# Patient Record
Sex: MALE | Race: BLACK OR AFRICAN AMERICAN | Employment: OTHER | URBAN - METROPOLITAN AREA
[De-identification: names, ages, dates, MRNs, and addresses within clinical notes are randomized per-mention and may not be internally consistent; named-entity substitution may affect disease eponyms.]

---

## 2017-11-28 ENCOUNTER — HOSPITAL ENCOUNTER (OUTPATIENT)
Dept: ENDOSCOPY | Age: 67
Discharge: HOME | End: 2017-11-28
Attending: INTERNAL MEDICINE | Admitting: INTERNAL MEDICINE

## 2017-11-28 RX ORDER — SODIUM CHLORIDE 0.9 % (FLUSH) 0.9 %
10 SYRINGE (ML) INJECTION PRN
Status: DISCONTINUED | OUTPATIENT
Start: 2017-11-28 | End: 2017-11-29 | Stop reason: HOSPADM

## 2017-11-28 RX ORDER — SODIUM CHLORIDE 0.9 % (FLUSH) 0.9 %
10 SYRINGE (ML) INJECTION EVERY 12 HOURS SCHEDULED
Status: DISCONTINUED | OUTPATIENT
Start: 2017-11-28 | End: 2017-11-29 | Stop reason: HOSPADM

## 2017-11-28 RX ORDER — SODIUM CHLORIDE 9 MG/ML
INJECTION, SOLUTION INTRAVENOUS CONTINUOUS
Status: DISCONTINUED | OUTPATIENT
Start: 2017-11-28 | End: 2017-11-29 | Stop reason: HOSPADM

## 2017-11-28 NOTE — ANESTHESIA PRE-OP
Department of Anesthesiology  Preprocedure Note       Name:  Rhett Chiu   Age:  79 y.o.  :  1950                                          MRN:  [id number]         Date:  2017      Surgeon:    Procedure:    Medications prior to admission:   Prior to Admission medications    Medication Sig Start Date End Date Taking? Authorizing Provider   naproxen (NAPROSYN) 500 MG tablet Take 1 tablet by mouth 2 times daily for 20 doses 9/24/16 10/4/16  Henry JAMAR Restrepo   atorvastatin (LIPITOR) 40 MG tablet Take 40 mg by mouth daily. Historical Provider, MD   niacin (NIASPAN) 500 MG CR tablet Take 1,000 mg by mouth nightly. Historical Provider, MD   vitamin D (CHOLECALCIFEROL) 400 UNIT TABS tablet Take 400 Units by mouth daily. Historical Provider, MD   amlodipine (NORVASC) 5 MG tablet Take 5 mg by mouth See Admin Instructions. Daily med ? mg     Historical Provider, MD       Current medications:    Current Outpatient Prescriptions   Medication Sig Dispense Refill    naproxen (NAPROSYN) 500 MG tablet Take 1 tablet by mouth 2 times daily for 20 doses 20 tablet 0    atorvastatin (LIPITOR) 40 MG tablet Take 40 mg by mouth daily.  niacin (NIASPAN) 500 MG CR tablet Take 1,000 mg by mouth nightly.  vitamin D (CHOLECALCIFEROL) 400 UNIT TABS tablet Take 400 Units by mouth daily.  amlodipine (NORVASC) 5 MG tablet Take 5 mg by mouth See Admin Instructions. Daily med ? mg        Current Facility-Administered Medications   Medication Dose Route Frequency Provider Last Rate Last Dose    0.9 % sodium chloride infusion   Intravenous Continuous Rochelle Oliva MD        sodium chloride flush 0.9 % injection 10 mL  10 mL Intravenous 2 times per day Rochelle Oliva MD        sodium chloride flush 0.9 % injection 10 mL  10 mL Intravenous PRN Rochelle Oliva MD           Allergies:  No Known Allergies    Problem List:  There is no problem list on file for this patient.       Past Medical

## 2018-05-07 ENCOUNTER — WALK IN (OUTPATIENT)
Dept: URGENT CARE | Age: 68
End: 2018-05-07

## 2018-05-07 ENCOUNTER — IMAGING SERVICES (OUTPATIENT)
Dept: GENERAL RADIOLOGY | Age: 68
End: 2018-05-07

## 2018-05-07 VITALS
SYSTOLIC BLOOD PRESSURE: 133 MMHG | RESPIRATION RATE: 16 BRPM | DIASTOLIC BLOOD PRESSURE: 60 MMHG | BODY MASS INDEX: 26.48 KG/M2 | OXYGEN SATURATION: 99 % | TEMPERATURE: 98.7 F | HEART RATE: 110 BPM | WEIGHT: 185 LBS | HEIGHT: 70 IN

## 2018-05-07 DIAGNOSIS — R05.9 COUGH: ICD-10-CM

## 2018-05-07 DIAGNOSIS — R06.02 SOB (SHORTNESS OF BREATH): Primary | ICD-10-CM

## 2018-05-07 DIAGNOSIS — J81.0 ACUTE PULMONARY EDEMA (CMD): ICD-10-CM

## 2018-05-07 DIAGNOSIS — R06.02 SOB (SHORTNESS OF BREATH): ICD-10-CM

## 2018-05-07 PROCEDURE — 94640 AIRWAY INHALATION TREATMENT: CPT | Performed by: FAMILY MEDICINE

## 2018-05-07 PROCEDURE — 93000 ELECTROCARDIOGRAM COMPLETE: CPT | Performed by: FAMILY MEDICINE

## 2018-05-07 PROCEDURE — 99214 OFFICE O/P EST MOD 30 MIN: CPT | Performed by: FAMILY MEDICINE

## 2018-05-07 PROCEDURE — 71046 X-RAY EXAM CHEST 2 VIEWS: CPT | Performed by: RADIOLOGY

## 2018-05-07 RX ORDER — IPRATROPIUM BROMIDE AND ALBUTEROL SULFATE 2.5; .5 MG/3ML; MG/3ML
3 SOLUTION RESPIRATORY (INHALATION) ONCE
Status: COMPLETED | OUTPATIENT
Start: 2018-05-07 | End: 2018-05-07

## 2018-05-07 RX ADMIN — IPRATROPIUM BROMIDE AND ALBUTEROL SULFATE 3 ML: 2.5; .5 SOLUTION RESPIRATORY (INHALATION) at 17:22

## 2018-11-28 ENCOUNTER — HOSPITAL ENCOUNTER (EMERGENCY)
Age: 68
Discharge: HOME | End: 2018-11-28
Attending: EMERGENCY MEDICINE
Payer: MEDICARE

## 2018-11-28 VITALS
BODY MASS INDEX: 26.98 KG/M2 | OXYGEN SATURATION: 95 % | TEMPERATURE: 98 F | SYSTOLIC BLOOD PRESSURE: 169 MMHG | HEIGHT: 68 IN | WEIGHT: 178 LBS | HEART RATE: 82 BPM | RESPIRATION RATE: 16 BRPM | DIASTOLIC BLOOD PRESSURE: 89 MMHG

## 2018-11-28 DIAGNOSIS — K62.5 RECTAL BLEED: ICD-10-CM

## 2018-11-28 DIAGNOSIS — K59.00 CONSTIPATION, UNSPECIFIED CONSTIPATION TYPE: Primary | ICD-10-CM

## 2018-11-28 PROCEDURE — 99283 EMERGENCY DEPT VISIT LOW MDM: CPT

## 2018-11-28 RX ORDER — MAGNESIUM CITRATE
150 SOLUTION, ORAL ORAL ONCE
Qty: 1 BOTTLE | Refills: 0 | Status: SHIPPED | OUTPATIENT
Start: 2018-11-28 | End: 2018-11-28

## 2018-11-28 NOTE — ED PROVIDER NOTES
display    All other labs were within normal range or not returned as ofthis dictation. EMERGENCYDEPARTMENT COURSE and DIFFERENTIAL DIAGNOSIS/MDM:   Vitals:    Vitals:    11/28/18 0252   BP: (!) 169/89   Pulse: 82   Resp: 16   Temp: 98 °F (36.7 °C)   TempSrc: Oral   SpO2: 95%   Weight: 178 lb (80.7 kg)   Height: 5' 8\" (1.727 m)       MDM:   Patient in follow-up in the outpatient setting with his family doctor and also GI. We'll trial mag citrate and I recommended continuing his suppositories and Preparation H.      CONSULTS:  None    PROCEDURES:  Unless otherwise noted below, none       Procedures    FINAL IMPRESSION      1. Constipation, unspecified constipation type    2.  Rectal bleed          DISPOSITION/PLAN   DISPOSITION Decision To Discharge 11/28/2018 02:57:00 AM      PATIENT REFERRED TO:  Xiomara Hidalgo MD  [address] [place] [address], [address]  [phone]    In 2 days  Please consult with your gastroenterologist if symptoms are not resolved      DISCHARGE MEDICATIONS:  New Prescriptions    MAGNESIUM CITRATE (CITROMA) SOLN    Take 150 mLs by mouth once for 1 dose          (Please note that portions of this note were completed with a voice recognition program.  Efforts weremade to edit the dictations but occasionally words are mis-transcribed.)    Kavya Perdomo III, DO (electronically signed)  Attending Emergency Physician         Denny Berumen III, DO  11/28/18 6899

## 2018-11-28 NOTE — ED NOTES
Pt received discharge instructions. Pt verbalizes understanding. Pt denies any questions. Pt able to ambulate free of distress out of the ED.         Mojgan Torre RN  11/28/18 4556

## 2019-08-21 ENCOUNTER — APPOINTMENT (OUTPATIENT)
Dept: MRI IMAGING | Age: 69
End: 2019-08-21
Payer: MEDICARE

## 2019-08-21 ENCOUNTER — HOSPITAL ENCOUNTER (OUTPATIENT)
Age: 69
Setting detail: OBSERVATION
Discharge: HOME | End: 2019-08-22
Attending: EMERGENCY MEDICINE | Admitting: INTERNAL MEDICINE
Payer: MEDICARE

## 2019-08-21 ENCOUNTER — APPOINTMENT (OUTPATIENT)
Dept: CT IMAGING | Age: 69
End: 2019-08-21
Payer: MEDICARE

## 2019-08-21 ENCOUNTER — APPOINTMENT (OUTPATIENT)
Dept: GENERAL RADIOLOGY | Age: 69
End: 2019-08-21
Payer: MEDICARE

## 2019-08-21 DIAGNOSIS — I65.23 BILATERAL CAROTID ARTERY STENOSIS: ICD-10-CM

## 2019-08-21 DIAGNOSIS — G45.9 TIA (TRANSIENT ISCHEMIC ATTACK): Primary | ICD-10-CM

## 2019-08-21 DIAGNOSIS — E04.1 THYROID NODULE: ICD-10-CM

## 2019-08-21 LAB
A/G RATIO: 1.7 (ref 1.1–2.2)
ALBUMIN SERPL-MCNC: 4.5 G/DL (ref 3.4–5)
ALP BLD-CCNC: 86 U/L (ref 40–129)
ALT SERPL-CCNC: 21 U/L (ref 10–40)
ANION GAP SERPL CALCULATED.3IONS-SCNC: 10 MMOL/L (ref 3–16)
APTT: 29.9 SEC (ref 26–36)
AST SERPL-CCNC: 21 U/L (ref 15–37)
BASOPHILS ABSOLUTE: 0.1 K/UL (ref 0–0.2)
BASOPHILS RELATIVE PERCENT: 1 %
BILIRUB SERPL-MCNC: 0.3 MG/DL (ref 0–1)
BUN BLDV-MCNC: 15 MG/DL (ref 7–20)
CALCIUM SERPL-MCNC: 9.9 MG/DL (ref 8.3–10.6)
CHLORIDE BLD-SCNC: 104 MMOL/L (ref 99–110)
CO2: 23 MMOL/L (ref 21–32)
CREAT SERPL-MCNC: 1 MG/DL (ref 0.8–1.3)
EKG ATRIAL RATE: 71 BPM
EKG DIAGNOSIS: NORMAL
EKG P AXIS: 51 DEGREES
EKG P-R INTERVAL: 140 MS
EKG Q-T INTERVAL: 390 MS
EKG QRS DURATION: 86 MS
EKG QTC CALCULATION (BAZETT): 423 MS
EKG R AXIS: 2 DEGREES
EKG T AXIS: 33 DEGREES
EKG VENTRICULAR RATE: 71 BPM
EOSINOPHILS ABSOLUTE: 0.1 K/UL (ref 0–0.6)
EOSINOPHILS RELATIVE PERCENT: 2 %
GFR AFRICAN AMERICAN: >60
GFR NON-AFRICAN AMERICAN: >60
GLOBULIN: 2.6 G/DL
GLUCOSE BLD-MCNC: 105 MG/DL (ref 70–99)
GLUCOSE BLD-MCNC: 118 MG/DL (ref 70–99)
HCT VFR BLD CALC: 44.1 % (ref 40.5–52.5)
HEMOGLOBIN: 15.1 G/DL (ref 13.5–17.5)
INR BLD: 0.96 (ref 0.86–1.14)
LYMPHOCYTES ABSOLUTE: 2.5 K/UL (ref 1–5.1)
LYMPHOCYTES RELATIVE PERCENT: 41.7 %
MAGNESIUM: 2 MG/DL (ref 1.8–2.4)
MCH RBC QN AUTO: 31 PG (ref 26–34)
MCHC RBC AUTO-ENTMCNC: 34.2 G/DL (ref 31–36)
MCV RBC AUTO: 90.7 FL (ref 80–100)
MONOCYTES ABSOLUTE: 0.8 K/UL (ref 0–1.3)
MONOCYTES RELATIVE PERCENT: 12.5 %
NEUTROPHILS ABSOLUTE: 2.6 K/UL (ref 1.7–7.7)
NEUTROPHILS RELATIVE PERCENT: 42.8 %
PDW BLD-RTO: 14.7 % (ref 12.4–15.4)
PERFORMED ON: ABNORMAL
PLATELET # BLD: 233 K/UL (ref 135–450)
PMV BLD AUTO: 8.8 FL (ref 5–10.5)
POTASSIUM SERPL-SCNC: 4.2 MMOL/L (ref 3.5–5.1)
PROTHROMBIN TIME: 11 SEC (ref 9.8–13)
RBC # BLD: 4.86 M/UL (ref 4.2–5.9)
SODIUM BLD-SCNC: 137 MMOL/L (ref 136–145)
TOTAL PROTEIN: 7.1 G/DL (ref 6.4–8.2)
TROPONIN: <0.01 NG/ML
TSH REFLEX: 0.59 UIU/ML (ref 0.27–4.2)
WBC # BLD: 6.1 K/UL (ref 4–11)

## 2019-08-21 PROCEDURE — 84484 ASSAY OF TROPONIN QUANT: CPT

## 2019-08-21 PROCEDURE — 93010 ELECTROCARDIOGRAM REPORT: CPT | Performed by: INTERNAL MEDICINE

## 2019-08-21 PROCEDURE — 83735 ASSAY OF MAGNESIUM: CPT

## 2019-08-21 PROCEDURE — 6370000000 HC RX 637 (ALT 250 FOR IP): Performed by: INTERNAL MEDICINE

## 2019-08-21 PROCEDURE — 85025 COMPLETE CBC W/AUTO DIFF WBC: CPT

## 2019-08-21 PROCEDURE — G0378 HOSPITAL OBSERVATION PER HR: HCPCS

## 2019-08-21 PROCEDURE — 99285 EMERGENCY DEPT VISIT HI MDM: CPT

## 2019-08-21 PROCEDURE — 85730 THROMBOPLASTIN TIME PARTIAL: CPT

## 2019-08-21 PROCEDURE — 93005 ELECTROCARDIOGRAM TRACING: CPT | Performed by: PHYSICIAN ASSISTANT

## 2019-08-21 PROCEDURE — 70551 MRI BRAIN STEM W/O DYE: CPT

## 2019-08-21 PROCEDURE — 84443 ASSAY THYROID STIM HORMONE: CPT

## 2019-08-21 PROCEDURE — 6360000004 HC RX CONTRAST MEDICATION: Performed by: EMERGENCY MEDICINE

## 2019-08-21 PROCEDURE — 70496 CT ANGIOGRAPHY HEAD: CPT

## 2019-08-21 PROCEDURE — 85610 PROTHROMBIN TIME: CPT

## 2019-08-21 PROCEDURE — 36415 COLL VENOUS BLD VENIPUNCTURE: CPT

## 2019-08-21 PROCEDURE — 71046 X-RAY EXAM CHEST 2 VIEWS: CPT

## 2019-08-21 PROCEDURE — 80053 COMPREHEN METABOLIC PANEL: CPT

## 2019-08-21 PROCEDURE — 2580000003 HC RX 258: Performed by: INTERNAL MEDICINE

## 2019-08-21 PROCEDURE — 70450 CT HEAD/BRAIN W/O DYE: CPT

## 2019-08-21 RX ORDER — ASPIRIN 300 MG/1
300 SUPPOSITORY RECTAL DAILY
Status: DISCONTINUED | OUTPATIENT
Start: 2019-08-21 | End: 2019-08-22 | Stop reason: HOSPADM

## 2019-08-21 RX ORDER — SODIUM CHLORIDE 0.9 % (FLUSH) 0.9 %
10 SYRINGE (ML) INJECTION PRN
Status: DISCONTINUED | OUTPATIENT
Start: 2019-08-21 | End: 2019-08-22 | Stop reason: HOSPADM

## 2019-08-21 RX ORDER — ONDANSETRON 2 MG/ML
4 INJECTION INTRAMUSCULAR; INTRAVENOUS EVERY 6 HOURS PRN
Status: DISCONTINUED | OUTPATIENT
Start: 2019-08-21 | End: 2019-08-22 | Stop reason: HOSPADM

## 2019-08-21 RX ORDER — OMEGA-3S/DHA/EPA/FISH OIL/D3 300MG-1000
400 CAPSULE ORAL DAILY
Status: DISCONTINUED | OUTPATIENT
Start: 2019-08-22 | End: 2019-08-22 | Stop reason: HOSPADM

## 2019-08-21 RX ORDER — TIZANIDINE 4 MG/1
4 TABLET ORAL EVERY 8 HOURS PRN
Status: DISCONTINUED | OUTPATIENT
Start: 2019-08-21 | End: 2019-08-22 | Stop reason: HOSPADM

## 2019-08-21 RX ORDER — ROSUVASTATIN CALCIUM 40 MG/1
40 TABLET, COATED ORAL NIGHTLY
Status: DISCONTINUED | OUTPATIENT
Start: 2019-08-21 | End: 2019-08-22 | Stop reason: HOSPADM

## 2019-08-21 RX ORDER — SODIUM CHLORIDE 0.9 % (FLUSH) 0.9 %
10 SYRINGE (ML) INJECTION EVERY 12 HOURS SCHEDULED
Status: DISCONTINUED | OUTPATIENT
Start: 2019-08-21 | End: 2019-08-22 | Stop reason: HOSPADM

## 2019-08-21 RX ORDER — ASPIRIN 81 MG/1
81 TABLET ORAL DAILY
Status: DISCONTINUED | OUTPATIENT
Start: 2019-08-21 | End: 2019-08-22 | Stop reason: HOSPADM

## 2019-08-21 RX ORDER — ROSUVASTATIN CALCIUM 40 MG/1
TABLET, COATED ORAL
Refills: 0 | COMMUNITY
Start: 2019-06-27

## 2019-08-21 RX ORDER — TIZANIDINE 2 MG/1
TABLET ORAL
Refills: 0 | COMMUNITY
Start: 2019-07-29 | End: 2022-02-03 | Stop reason: ALTCHOICE

## 2019-08-21 RX ORDER — LOSARTAN POTASSIUM 25 MG/1
100 TABLET ORAL DAILY
Refills: 0 | COMMUNITY
Start: 2019-07-29

## 2019-08-21 RX ADMIN — ASPIRIN 81 MG: 81 TABLET, COATED ORAL at 17:01

## 2019-08-21 RX ADMIN — Medication 10 ML: at 23:40

## 2019-08-21 RX ADMIN — IOPAMIDOL 75 ML: 755 INJECTION, SOLUTION INTRAVENOUS at 12:12

## 2019-08-21 RX ADMIN — ROSUVASTATIN CALCIUM 40 MG: 40 TABLET, FILM COATED ORAL at 23:39

## 2019-08-21 ASSESSMENT — PAIN SCALES - GENERAL: PAINLEVEL_OUTOF10: 0

## 2019-08-21 NOTE — ED PROVIDER NOTES
Neck: Normal range of motion. No thyromegaly present. The patient does have 2/6 bilateral carotid bruits. Cardiovascular: Normal rate, regular rhythm, normal heart sounds and intact distal pulses. No murmur heard. Pulmonary/Chest: Effort normal and breath sounds normal. He exhibits no tenderness. Abdominal: Soft. Bowel sounds are normal. He exhibits no abdominal bruit. There is no hepatosplenomegaly. There is no tenderness. No evidence of abdominal or femoral bruits. Musculoskeletal: Normal range of motion. Lymphadenopathy:     He has no cervical adenopathy. Neurological: He is alert and oriented to person, place, and time. He displays normal reflexes. No cranial nerve deficit. Coordination normal.   Evidence of drift. Sensory intact with exception of left cheek/jaw area. Skin: Skin is warm and dry. Psychiatric: He has a normal mood and affect. His behavior is normal. Judgment and thought content normal.   Nursing note and vitals reviewed.       DIAGNOSTIC RESULTS   LABS:    Labs Reviewed   COMPREHENSIVE METABOLIC PANEL - Abnormal; Notable for the following components:       Result Value    Glucose 118 (*)     All other components within normal limits    Narrative:     Performed at:  [facility] [address], [place], [address] [employer]   Phone [phone]   POCT GLUCOSE - Abnormal; Notable for the following components:    POC Glucose 105 (*)     All other components within normal limits    Narrative:     Performed at:  [facility] [address], [place], [address] [employer]   Phone [phone]   CBC WITH AUTO DIFFERENTIAL    Narrative:     Performed at:  [facility] [address], [place], [address] [employer]   Phone [phone]   TROPONIN    Narrative:     Performed at:  [facility] [address], [place], [address] [employer]   Phone [phone]   APTT    Narrative: Performed at:  [facility]  [address]  [place], [address]   Phone [phone]   PROTIME-INR    Narrative:     Performed at:  [facility]  [address] [facility]  [place], [address]   Phone [phone]   MAGNESIUM    Narrative:     Performed at:  [facility]  [address] [facility]  [place], [address]   Phone [phone]   URINE RT REFLEX TO CULTURE   TSH WITH REFLEX   POCT GLUCOSE       All other labs were within normal range or not returned as of this dictation. EKG: All EKG's are interpreted by the Emergency Department Physician in the absence of a cardiologist.  Please see their note for interpretation of EKG. RADIOLOGY:   Non-plain film images such as CT, Ultrasound and MRI are read by the radiologist. Marina Hemphill radiographic images are visualized andpreliminarily interpreted by the  ED Provider with the below findings:    Chest x-ray shows no acute cardiopulmonary pathology. CT head without contrast is negative for acute pathology. CTA head and neck shows no acute pathology. Incidental finding of thyroid nodule. Interpretation [facility] Radiologist below, if available at the time of this note:    XR CHEST STANDARD (2 VW)   Final Result   No acute findings         CT Head WO Contrast   Final Result   No acute intracranial abnormality. Findings were discussed with Moise Perez at 11:45 am on 8/21/2019. CTA HEAD NECK W CONTRAST   Preliminary Result   No high-grade stenosis or focal occlusion involving the intracranial or   cervical vasculature. No evidence of acute dissection. No evidence of   aneurysm. Homogeneously enlarged thyroid gland without thyroid nodule. Recommend   correlation with thyroid labs. Emphysema. No results found.         PROCEDURES   Unless otherwise noted below, none     Procedures    CRITICAL CARE TIME   N/A    CONSULTS:  IP CONSULT TO

## 2019-08-21 NOTE — ED NOTES
Pharmacy Medication History Note      List of current medications patient is taking is complete. Source of information: patient    Changes made to medication list:  Medications flagged for removal (include reason, ex. noncompliance):  N/A    Medications removed (include reason, ex. therapy complete or physician discontinued):  Naproxen- therapy complete  Atorvastatin- discontinued  Niacin- therapy complete    Medications added/doses adjusted:  Rouvastatin 40mg- QD    Other notes (ex. Recent course of antibiotics, Coumadin dosing):  Denies use of other OTC or herbal medications. Last dose times updated. Nicole Rodriguez [facility]    No current facility-administered medications on file prior to encounter. Current Outpatient Medications on File Prior to Encounter   Medication Sig Dispense Refill    losartan (COZAAR) 25 MG tablet TAKE 1 TABLET BY MOUTH ONE TIME A DAY  0    tiZANidine (ZANAFLEX) 2 MG tablet TAKE 1 TABLET BY MOUTH EVERY SIX HOURS AS NEEDED  0    rosuvastatin (CRESTOR) 40 MG tablet TAKE 1 TABLET BY MOUTH ONE TIME A DAY  0    vitamin D (CHOLECALCIFEROL) 400 UNIT TABS tablet Take 400 Units by mouth daily. amlodipine (NORVASC) 5 MG tablet Take 5 mg by mouth daily       [DISCONTINUED] naproxen (NAPROSYN) 500 MG tablet Take 1 tablet by mouth 2 times daily for 20 doses 20 tablet 0    [DISCONTINUED] atorvastatin (LIPITOR) 40 MG tablet Take 40 mg by mouth daily. [DISCONTINUED] niacin (NIASPAN) 500 MG CR tablet Take 1,000 mg by mouth nightly.

## 2019-08-21 NOTE — H&P
HOSPITALISTS HISTORY AND PHYSICAL    8/21/2019 2:20 PM    Patient Information:  Gloria Aldrich is a 71 y.o. male [id number]  PCP:  Harris Muñiz MD (Tel: [phone] )    Chief complaint:    Chief Complaint   Patient presents with    Numbness     left arm numbness x1 week. left leg and left sided facial numbness x1 hour. BP [address]        History of Present Illness:  Alana Ackerman is a 71 y.o. male patient presented with wife and currently in wheelchair at triage. I am called to triage. Patient reporting tingling left arm x1 week and the patient now reports tingling, numbness and weakness of left face and left leg beginning approximately 1 hour ago or approximately 10:15 AM.  States he noticed this on the work and drove home. States he had some difficulty with walking but was able to walk. He indicates no headache at this time. He does indicate a mild stiff neck yesterday but it resolved as of today. He has not had previous similar occurrence. Denies chest pain, shortness of breath, vertigo, dizziness, bowel or bladder incontinence or visual changes. Patient does report history of hypertension. He does take his medication as prescribed. His blood pressure initially is 162/83 pulse 84. Blood sugar is 105. Patient has not had previous similar occurrence. He does report his father had first stroke at age 67. This patient ages 71.     Patient does have history of hypertension, hyperlipidemia and he does smoke.     Patient does have surgical history of neck surgery and hernia repair.     It is now 11:55 AM.  The patient a tingling in the weakness the leg have resolved. He states he has decreased but mild residual tingling/numbness of the left cheek area. Patient does state the left arm tingling has resolved.       REVIEW OF SYSTEMS:   10 point ROS was completed and negative unless noted pulses palpable   Labs:  CBC:   Lab Results   Component Value Date    WBC 6.1 2019    RBC 4.86 2019    HGB 15.1 2019    HCT 44.1 2019    MCV 90.7 2019    MCH 31.0 2019    MCHC 34.2 2019    RDW 14.7 2019     2019    MPV 8.8 2019     BMP:    Lab Results   Component Value Date     2019    K 4.2 2019     2019    CO2 23 2019    BUN 15 2019    CREATININE 1.0 2019    CALCIUM 9.9 2019    GFRAA >60 2019    GFRAA >60 2011    LABGLOM >60 2019    GLUCOSE 118 2019     XR CHEST STANDARD (2 VW)   Final Result   No acute findings         CT Head WO Contrast   Final Result   No acute intracranial abnormality. Findings were discussed with Tristen Silverman at 11:45 am on 2019. CTA HEAD NECK W CONTRAST   Preliminary Result   No high-grade stenosis or focal occlusion involving the intracranial or   cervical vasculature. No evidence of acute dissection. No evidence of   aneurysm. Homogeneously enlarged thyroid gland without thyroid nodule. Recommend   correlation with thyroid labs. Emphysema. Chest Xray: Nothing acute  EKG: Normal sinus rhythm  CTA of the head and neck was negative for acute disease  CT scan of the head was negative    Problem List  Active Problems:    TIA (transient ischemic attack)  Resolved Problems:    * No resolved hospital problems.  *        Assessment/Plan:   TIA resolved patient has risk factor in the form of hypertension and smoking obtain MRI of the head echo give aspirin and Crestor    STROKE Pathway activated    Permissive hypertension    Smoker advised to quit smoking      DVT prophylaxis Lovenox  Code status Full  Diet cardiac     Nathalia Wilkinson MD    2019 2:20 PM

## 2019-08-21 NOTE — CARE COORDINATION
Discharge Planning Assessment  SW discharge planner met with patient to discuss reason for admission, current living situation, and potential needs at the time of discharge. Pt in ED d/t TIA    Demographics/Insurance verified:  Yes    Current type of dwelling:  House    Living arrangements:  w/spouse     Level of function/Support:  Pt reports he is independent with ADLs and this has not affected his ability to walk or function. 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 Spouse is supportive. PCP:  Dr. Yakelin Dennison    Last Visit to PCP:  June    DME:  None. No needs reported. Active with any community resources/agencies/skilled home care:   None. No non-skilled needs reported. Medication compliance issues:  No    Financial issues that could impact healthcare:  No    Transportation at the time of discharge:  Pt drives. Spouse will take home. Tentative discharge plan:  Home most likely. No needs identified.     Electronically signed by ALENA Otero, LSW on 8/21/2019 at 4:37 PM

## 2019-08-21 NOTE — ED PROVIDER NOTES
I independently performed a history and physical on [facility]. All diagnostic, treatment, and disposition decisions were made by myself in conjunction with the advanced practice provider. Briefly, this is a 71 y.o. male here for left-sided numbness. The patient has had intermittent numbness to his left arm for the last couple of weeks. Today, he developed numbness to his left leg and left face along with his left arm, which was concerning. His family, including his daughter who is a nurse, or particularly concerned made him come to the emergency department. The patient did not have any associated focal weakness. He had no speech changes. He had no headaches he did not pass out. He had no visual changes. .    On exam, the patient's symptoms seem to have resolved as the patient is equal and normal sensation to light touch throughout his entire body, including his affected extremities and face. He is alert and oriented to person, place, time, and situation. CN II-XII intact as tested. Strength 5/5 in upper and lower extremities bilaterally. Normal reflexes. No pronator drift. Normal finger-nose bilaterally. Normal heel-shin bilaterally. No speech difficulties or slurring. Heart is RRR. Lungs CTAB. EKG   The Ekg interpreted by me in the absence of a cardiologist shows. normal sinus rhythm with a rate of 71; occasional PVCs  Axis is   Normal  QTc is  normal  Intervals and Durations are unremarkable. No specific ST-T wave changes appreciated. No evidence of acute ischemia. No significant change from prior EKG dated 6/4/2011    Screenings  NIH Stroke Scale  NIH Stroke Scale Assessed: Yes  Interval: Baseline  Level of Consciousness (1a. ): Alert  LOC Questions (1b. ):  Answers both correctly  LOC Commands (1c. ): Performs both tasks correctly  Best Gaze (2. ): Normal  Visual (3. ): No visual loss  Facial Palsy (4. ): Normal symmetrical movement  Motor Arm, Left (5a. ): No drift  Motor Arm, Right

## 2019-08-22 ENCOUNTER — APPOINTMENT (OUTPATIENT)
Dept: GENERAL RADIOLOGY | Age: 69
End: 2019-08-22
Payer: MEDICARE

## 2019-08-22 VITALS
BODY MASS INDEX: 26.5 KG/M2 | OXYGEN SATURATION: 96 % | RESPIRATION RATE: 16 BRPM | SYSTOLIC BLOOD PRESSURE: 145 MMHG | DIASTOLIC BLOOD PRESSURE: 71 MMHG | WEIGHT: 174.82 LBS | HEIGHT: 68 IN | HEART RATE: 54 BPM | TEMPERATURE: 97.4 F

## 2019-08-22 LAB
CHOLESTEROL, TOTAL: 142 MG/DL (ref 0–199)
ESTIMATED AVERAGE GLUCOSE: 105.4 MG/DL
HBA1C MFR BLD: 5.3 %
HCT VFR BLD CALC: 43.3 % (ref 40.5–52.5)
HDLC SERPL-MCNC: 49 MG/DL (ref 40–60)
HEMOGLOBIN: 14.6 G/DL (ref 13.5–17.5)
LDL CHOLESTEROL CALCULATED: 37 MG/DL
LEFT VENTRICULAR EJECTION FRACTION HIGH VALUE: 55 %
LEFT VENTRICULAR EJECTION FRACTION MODE: NORMAL
LV EF: 55 %
LVEF MODALITY: NORMAL
MCH RBC QN AUTO: 31.2 PG (ref 26–34)
MCHC RBC AUTO-ENTMCNC: 33.7 G/DL (ref 31–36)
MCV RBC AUTO: 92.4 FL (ref 80–100)
PDW BLD-RTO: 14.6 % (ref 12.4–15.4)
PLATELET # BLD: 208 K/UL (ref 135–450)
PMV BLD AUTO: 8.6 FL (ref 5–10.5)
RBC # BLD: 4.68 M/UL (ref 4.2–5.9)
TRIGL SERPL-MCNC: 280 MG/DL (ref 0–150)
VLDLC SERPL CALC-MCNC: 56 MG/DL
WBC # BLD: 4.9 K/UL (ref 4–11)

## 2019-08-22 PROCEDURE — C8923 2D TTE W OR W/O FOL W/CON,CO: HCPCS

## 2019-08-22 PROCEDURE — 80061 LIPID PANEL: CPT

## 2019-08-22 PROCEDURE — 92526 ORAL FUNCTION THERAPY: CPT

## 2019-08-22 PROCEDURE — 73030 X-RAY EXAM OF SHOULDER: CPT

## 2019-08-22 PROCEDURE — 97165 OT EVAL LOW COMPLEX 30 MIN: CPT

## 2019-08-22 PROCEDURE — 83036 HEMOGLOBIN GLYCOSYLATED A1C: CPT

## 2019-08-22 PROCEDURE — 85027 COMPLETE CBC AUTOMATED: CPT

## 2019-08-22 PROCEDURE — 92610 EVALUATE SWALLOWING FUNCTION: CPT

## 2019-08-22 PROCEDURE — G0378 HOSPITAL OBSERVATION PER HR: HCPCS

## 2019-08-22 PROCEDURE — 36415 COLL VENOUS BLD VENIPUNCTURE: CPT

## 2019-08-22 PROCEDURE — 94760 N-INVAS EAR/PLS OXIMETRY 1: CPT

## 2019-08-22 PROCEDURE — 73010 X-RAY EXAM OF SHOULDER BLADE: CPT

## 2019-08-22 PROCEDURE — 6370000000 HC RX 637 (ALT 250 FOR IP): Performed by: INTERNAL MEDICINE

## 2019-08-22 RX ORDER — ASPIRIN 81 MG/1
81 TABLET ORAL DAILY
Qty: 30 TABLET | Refills: 3 | Status: SHIPPED | OUTPATIENT
Start: 2019-08-23

## 2019-08-22 RX ADMIN — CHOLECALCIFEROL (VITAMIN D3) 10 MCG (400 UNIT) TABLET 400 UNITS: at 08:42

## 2019-08-22 RX ADMIN — ASPIRIN 81 MG: 81 TABLET, COATED ORAL at 08:42

## 2019-08-22 RX ADMIN — TIZANIDINE 4 MG: 4 TABLET ORAL at 08:42

## 2019-08-22 ASSESSMENT — PAIN SCALES - GENERAL
PAINLEVEL_OUTOF10: 2
PAINLEVEL_OUTOF10: 0
PAINLEVEL_OUTOF10: 5

## 2019-08-22 ASSESSMENT — PAIN DESCRIPTION - LOCATION: LOCATION: ARM;SHOULDER

## 2019-08-22 ASSESSMENT — PAIN DESCRIPTION - PAIN TYPE: TYPE: OTHER (COMMENT)

## 2019-08-22 ASSESSMENT — PAIN DESCRIPTION - ONSET: ONSET: OTHER (COMMENT)

## 2019-08-22 ASSESSMENT — PAIN DESCRIPTION - ORIENTATION: ORIENTATION: LEFT

## 2019-08-22 NOTE — PROGRESS NOTES
Ef is 55% so nurse will dc to home now, IV out. Tele off. Discharged to home at this time, wife at the bedside.

## 2019-08-22 NOTE — PLAN OF CARE
Problem: Pain:  Goal: Pain level will decrease  Description  Pain level will decrease  8/22/2019 1030 by Essie Boothe, RN  Note:   States pain lt upper arm/shoulder @5, has this pain after sleepng,feels it is a muscle pain. Zanaflex given. Will monitor  8/22/2019 0414 by Doreen Ball RN  Outcome: Met This Shift  Note:   Pt reports chronic pain on his left shoulder and intermittent tingling on left arm, but denies any pain during the shift. Will continue to assess.

## 2019-08-22 NOTE — PLAN OF CARE
Problem: HEMODYNAMIC STATUS  Goal: Patient has stable vital signs and fluid balance  Outcome: Met This Shift  Note:   VSS, no fluid deficit is noticed. Problem: ACTIVITY INTOLERANCE/IMPAIRED MOBILITY  Goal: Mobility/activity is maintained at optimum level for patient  Outcome: Met This Shift  Note:   Pt is A&Ox4, ambulatory and independent. Mobility/activity is maintained at optimal level. Problem: COMMUNICATION IMPAIRMENT  Goal: Ability to express needs and understand communication  Outcome: Met This Shift  Note:   Pt has no communication deficit. Able to express needs and  understand eduction on stroke/TIA. Problem: Pain:  Goal: Pain level will decrease  Description  Pain level will decrease  Outcome: Met This Shift  Note:   Pt reports chronic pain on his left shoulder and intermittent tingling on left arm, but denies any pain during the shift. Will continue to assess. Problem: Falls - Risk of:  Goal: Will remain free from falls  Description  Will remain free from falls  Outcome: Met This Shift  Note:   Pt remains free from falls. Safety precautions in place. Bed in lowest position, bed wheels locked, call light with in reach, low fall risk. Will continue to monitor.

## 2019-08-22 NOTE — PROGRESS NOTES
Physical Therapy      Chart reviewed. OT completed evaluation and reported no PT needs at this time. Pt independent with functional mobility. Safe to discharge home. If symptoms of shoulder continue pt may benefit from outpt PT or OT evaluations. PT orders discharged at this time.   Thanks, Bobby Cabrera, DPT [id number]

## 2019-09-27 ENCOUNTER — TELEPHONE (OUTPATIENT)
Dept: CARDIOLOGY CLINIC | Age: 69
End: 2019-09-27